# Patient Record
Sex: MALE | Race: BLACK OR AFRICAN AMERICAN | NOT HISPANIC OR LATINO
[De-identification: names, ages, dates, MRNs, and addresses within clinical notes are randomized per-mention and may not be internally consistent; named-entity substitution may affect disease eponyms.]

---

## 2024-10-02 ENCOUNTER — APPOINTMENT (OUTPATIENT)
Dept: GASTROENTEROLOGY | Facility: CLINIC | Age: 24
End: 2024-10-02
Payer: COMMERCIAL

## 2024-10-02 DIAGNOSIS — R01.1 CARDIAC MURMUR, UNSPECIFIED: ICD-10-CM

## 2024-10-02 DIAGNOSIS — G47.30 SLEEP APNEA, UNSPECIFIED: ICD-10-CM

## 2024-10-02 DIAGNOSIS — J45.909 UNSPECIFIED ASTHMA, UNCOMPLICATED: ICD-10-CM

## 2024-10-02 DIAGNOSIS — K22.89 OTHER SPECIFIED DISEASE OF ESOPHAGUS: ICD-10-CM

## 2024-10-02 PROBLEM — Z00.00 ENCOUNTER FOR PREVENTIVE HEALTH EXAMINATION: Status: ACTIVE | Noted: 2024-10-02

## 2024-10-02 PROCEDURE — G2211 COMPLEX E/M VISIT ADD ON: CPT | Mod: NC

## 2024-10-02 PROCEDURE — 99203 OFFICE O/P NEW LOW 30 MIN: CPT

## 2024-10-02 RX ORDER — BUDESONIDE AND FORMOTEROL FUMARATE DIHYDRATE 160; 4.5 UG/1; UG/1
160-4.5 AEROSOL RESPIRATORY (INHALATION)
Refills: 0 | Status: ACTIVE | COMMUNITY

## 2024-10-02 NOTE — HISTORY OF PRESENT ILLNESS
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  Patient reports doing well today.  He said having the procedure for intermittent epigastric pain (over 1 year) and rectal bleed x 3.   He denies any difficulty swallowing, Nausea, Vomiting, bloating, belching, epigastric pain or weight loss.   Denies any family history of GI cancers.      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  Biopsied. -Gastritis. -Normal ampulla, duodenal bulb, first portion of the duodenum and second portion of the duodenum.  Biopsied. -The examination was otherwise normal. -Several biopsies were obtained in the gastric body and in the gastric antrum.   Biopsy results A.  Duodenal mucosa with no significant histopathologic changes. B.  Gastric antrum - mild reactive gastropathy. C.  Gastric, body - gastric mucosa with no significant histopathologic changes.  Negative for H. pylori and IM B.  Esophagus, GE junction - acute erosive esophagitis with granulation tissue.  Negative for fungi.

## 2024-10-02 NOTE — ASSESSMENT
[FreeTextEntry1] : 24-year-old male status post upper endoscopy noting to have an esophageal tumor at the GEJ with biopsies reading acute erosive esophagitis with granulation tissue. Reviewed results with the patient today.  Discussed the recommendations for further evaluation with endoscopic ultrasound with biopsies.  He understood.   We discussed the proposed procedure, preparation and alternatives.  The risks (bleeding, perforation, infection) and benefits were discussed with the patient in details.  The patient understands the risks/benefits and agreed to proceed with procedure.   Plan 1.  EUS with Dr. Butts on 10/4/2024 2.  Instructions to be emailed  All questions answered.  Patient to call me with any questions.

## 2024-10-02 NOTE — REASON FOR VISIT
[Home] : at home, [unfilled] , at the time of the visit. [Medical Office: ([facility])___] : at the medical office located in  [Patient] : the patient [Self] : self [Consultation] : a consultation visit [FreeTextEntry1] : Referred by Dr Aram Azul (outside referring) for esophageal mass.

## 2024-10-04 ENCOUNTER — APPOINTMENT (OUTPATIENT)
Dept: GASTROENTEROLOGY | Facility: HOSPITAL | Age: 24
End: 2024-10-04

## 2024-10-04 ENCOUNTER — RESULT REVIEW (OUTPATIENT)
Age: 24
End: 2024-10-04

## 2024-10-04 ENCOUNTER — OUTPATIENT (OUTPATIENT)
Dept: OUTPATIENT SERVICES | Facility: HOSPITAL | Age: 24
LOS: 1 days | Discharge: HOME | End: 2024-10-04
Payer: COMMERCIAL

## 2024-10-04 ENCOUNTER — TRANSCRIPTION ENCOUNTER (OUTPATIENT)
Age: 24
End: 2024-10-04

## 2024-10-04 VITALS
HEART RATE: 66 BPM | DIASTOLIC BLOOD PRESSURE: 71 MMHG | OXYGEN SATURATION: 97 % | SYSTOLIC BLOOD PRESSURE: 117 MMHG | RESPIRATION RATE: 15 BRPM

## 2024-10-04 VITALS
TEMPERATURE: 97 F | RESPIRATION RATE: 16 BRPM | OXYGEN SATURATION: 100 % | HEART RATE: 52 BPM | SYSTOLIC BLOOD PRESSURE: 137 MMHG | HEIGHT: 72 IN | WEIGHT: 220.02 LBS | DIASTOLIC BLOOD PRESSURE: 75 MMHG

## 2024-10-04 DIAGNOSIS — K22.89 OTHER SPECIFIED DISEASE OF ESOPHAGUS: ICD-10-CM

## 2024-10-04 PROCEDURE — 43237 ENDOSCOPIC US EXAM ESOPH: CPT

## 2024-10-04 PROCEDURE — 88305 TISSUE EXAM BY PATHOLOGIST: CPT | Mod: 26

## 2024-10-04 PROCEDURE — 43254 EGD ENDO MUCOSAL RESECTION: CPT

## 2024-10-04 DEVICE — DUETTE MULTI-BAND: Type: IMPLANTABLE DEVICE | Status: FUNCTIONAL

## 2024-10-04 DEVICE — IMPLANTABLE DEVICE: Type: IMPLANTABLE DEVICE | Status: FUNCTIONAL

## 2024-10-04 DEVICE — CLIP HEMO INSTINCT PLUS ENDOSCOPIC: Type: IMPLANTABLE DEVICE | Status: FUNCTIONAL

## 2024-10-04 RX ORDER — OMEPRAZOLE 20 MG/1
20 CAPSULE, DELAYED RELEASE ORAL TWICE DAILY
Qty: 28 | Refills: 0 | Status: ACTIVE | COMMUNITY
Start: 2024-10-04 | End: 1900-01-01

## 2024-10-04 RX ORDER — SUCRALFATE 1 G/1
1 TABLET ORAL
Qty: 42 | Refills: 0 | Status: ACTIVE | COMMUNITY
Start: 2024-10-04 | End: 1900-01-01

## 2024-10-04 NOTE — ASU DISCHARGE PLAN (ADULT/PEDIATRIC) - NSDISCH_INCISION_ENDO_ALL_CORE_FT
If an incision was performed as part of your procedure, contact your doctor with any pain, swelling, redness, or other concerns you may have about that area. 64

## 2024-10-04 NOTE — PRE PROCEDURE NOTE - PRE PROCEDURE EVALUATION
HPI:    Here for endoscopic ultrasound.     PAST MEDICAL & SURGICAL HISTORY:    See chart.    MEDICATIONS:    See chart.     ALLERGIES:    See chart.     SOCIAL HISTORY:     Denies toxic habits.     FAMILY HISTORY:    Noncontributory.     REVIEW OF SYSTEMS:  CONSTITUTIONAL: No weakness, fevers or chills.  EYES/ENT: No visual changes;  No vertigo or throat pain.  NECK: No pain or stiffness.  RESPIRATORY: No cough, wheezing, hemoptysis; No shortness of breath.  CARDIOVASCULAR: No chest pain or palpitations.  GASTROINTESTINAL: As per HPI.   GENITOURINARY: No dysuria, frequency or hematuria.  NEUROLOGICAL: No numbness or weakness.  SKIN: No itching, rashes.      PHYSICAL EXAM:  VITAL SIGNS:  See chart.     GENERAL: YVAN, non toxic, comfortable in bed  HEENT: EOMI, no icterus, no tracheal deviation, moist mucus membranes   CARDIO: Regular rate and rhythm, no murmurs, rubs or gallops  LUNGS: No wheezing, rales or rhonchi  ABDOMEN: Soft, non tender, non distended, no rebound or guarding  VASCULAR: Warm and well perfused without peripheral edema and palpable pulses  PSYCH AAO x 3, normal mood and affect   SKIN: warm, dry, intact     LABS:                 RADIOLOGY & ADDITIONAL TESTS: Reviewed.       Risks, benefits, and alternatives of the procedure discussed at length including but not limited to bleeding, perforation, anesthesia related complication, infection, etc. Patient expressed understanding and agreeable for procedure. Patient stable for planned procedure.

## 2024-10-04 NOTE — ASU DISCHARGE PLAN (ADULT/PEDIATRIC) - NS MD DC FALL RISK RISK
For information on Fall & Injury Prevention, visit: [email or web address] OR  [email or web address] OR  https://www.cdc.gov/steadi/patient.html

## 2024-10-08 ENCOUNTER — NON-APPOINTMENT (OUTPATIENT)
Age: 24
End: 2024-10-08

## 2024-10-08 LAB — SURGICAL PATHOLOGY STUDY: SIGNIFICANT CHANGE UP

## 2025-03-19 ENCOUNTER — EMERGENCY (EMERGENCY)
Facility: HOSPITAL | Age: 25
LOS: 0 days | Discharge: HOME | End: 2025-03-19
Attending: STUDENT IN AN ORGANIZED HEALTH CARE EDUCATION/TRAINING PROGRAM
Payer: COMMERCIAL

## 2025-03-19 VITALS
WEIGHT: 214.07 LBS | OXYGEN SATURATION: 97 % | SYSTOLIC BLOOD PRESSURE: 138 MMHG | TEMPERATURE: 98 F | HEART RATE: 77 BPM | HEIGHT: 72 IN | RESPIRATION RATE: 18 BRPM | DIASTOLIC BLOOD PRESSURE: 93 MMHG

## 2025-03-19 DIAGNOSIS — G47.33 OBSTRUCTIVE SLEEP APNEA (ADULT) (PEDIATRIC): ICD-10-CM

## 2025-03-19 DIAGNOSIS — H16.133 PHOTOKERATITIS, BILATERAL: ICD-10-CM

## 2025-03-19 DIAGNOSIS — H57.13 OCULAR PAIN, BILATERAL: ICD-10-CM

## 2025-03-19 DIAGNOSIS — J45.909 UNSPECIFIED ASTHMA, UNCOMPLICATED: ICD-10-CM

## 2025-03-19 DIAGNOSIS — H53.143 VISUAL DISCOMFORT, BILATERAL: ICD-10-CM

## 2025-03-19 PROCEDURE — 99284 EMERGENCY DEPT VISIT MOD MDM: CPT

## 2025-03-19 RX ORDER — ERYTHROMYCIN 5 MG/G
1 OINTMENT OPHTHALMIC
Qty: 1 | Refills: 0
Start: 2025-03-19 | End: 2025-03-21

## 2025-03-19 NOTE — ED ADULT NURSE NOTE - OBJECTIVE STATEMENT
24 year old ellen came in Patient reports" I was working last night. and my eyes exposed to a third rail flash. " First vision was diminished in Right eye. Today both eyes red "It feels like sandpaper is in the eyes."  Patient was 3 feet away from rail. Denies vision loss. + photophobia. Rates 6/10  PMH: Asthma, Heat murmur, Sleep apnea, Esophagus mass excised.

## 2025-03-19 NOTE — ED PROVIDER NOTE - PHYSICAL EXAMINATION
GEN: Awake, alert, interactive, NAD.  HEAD AND NECK: NC/AT. Airway patent. Neck supple.   EYES:  Clear b/l. EOMI. PERRL. Visual acuity -  R 20/30, L 20/25 (@ baseline). No fluorescein uptake on exam, negative Giulia's sign.   ENT: Moist mucus membranes.   CARDIAC: Regular rate, regular rhythm. No evident pedal edema.    RESP/CHEST: Normal respiratory effort with no use of accessory muscles or retractions. Clear throughout on auscultation.  ABD: Soft, non-distended, non-tender. No rebound, no guarding.   BACK: No midline spinal TTP. No CVAT.   EXTREMITIES: Moving all extremities with no apparent deformities.   SKIN: Warm, dry, intact normal color. No rash.   NEURO: AOx3, CN II-XII grossly intact, no focal deficits.   PSYCH: Appropriate mood and affect.

## 2025-03-19 NOTE — ED PROVIDER NOTE - PATIENT PORTAL LINK FT
You can access the FollowMyHealth Patient Portal offered by [facility] by registering at the following website: [email or web address]. By joining [employer]’s FollowMyHealth portal, you will also be able to view your health information using other applications (apps) compatible with our system.

## 2025-03-19 NOTE — ED PROVIDER NOTE - NSFOLLOWUPCLINICS_GEN_ALL_ED_FT
[facility] - Ophthalmology  Ophthalmology  [address], [address]  [place] [address]  Phone: [phone]  Fax:     [facility] Ophthal[facility]  Ophthalmology  [address], [address]  [place] [address]  Phone: [phone]  Fax:

## 2025-03-19 NOTE — ED PROVIDER NOTE - CLINICAL SUMMARY MEDICAL DECISION MAKING FREE TEXT BOX
24M PMH Asthma, CESARIO who presents to ED w/ bilateral eye discomfort, photophobia s/p exposure to bright flash of light at work this AM. Pt states he works for the [employer], reports piece of metal came into contact w/ electrified 3rd rail, w/ resulting extremely bright flash of light. Pt states he was ~3ft from the rail. Pt himself did not physically come into contact w/ the 3rd rail. Pt states initially unable to see anything, w/ gradual return of vision. Pt endorses sandpaper sensation to BL eyes. Pt also reports w/ BL ear discomfort, ringing, which is resolving. ROS otherwise negative. Pt uses glasses for distance, does not use contact lenses. Pt was not wearing protective eyewear during exposure.     On PE - pt well-appearing, no acute distress, head atraumatic, normocephalic, eyes are clear bilaterally, PERRL, visual acuity RT 20/30, LT 20/25 (baseline discrepancy), No fluorescein uptake on exam.     Most likely photokeratitis given history. Will DC home w/ erythromycin ointment, OTC medications for pain relief, and outpatient Optho follow-up.

## 2025-03-19 NOTE — ED PROVIDER NOTE - ATTENDING APP SHARED VISIT CONTRIBUTION OF CARE
Eliana: Initial patient evaluation and treatment plan initiated by myself, pt care transitioned to ACP to follow results of ED w/u and implementation of agreed upon disposition.

## 2025-03-19 NOTE — ED PROVIDER NOTE - NSFOLLOWUPINSTRUCTIONS_ED_ALL_ED_FT
Follow-up with your Primary Care Physician within the next week.  Follow-up with Opthalmology (Eye Doctor) as discussed.    Medications  - Take Tylenol (Acetaminophen) 650 mg every 4-6 hours AND/OR Motrin (Ibuprofen) 600 mg every 6-8 hours as needed for pain/fever.  - Erythromycin ointment, 1 application, 4 times a day, for 3 days.   - Lubricating eye drops / gel as needed for eye irritation / discomfort.    Advance activity as tolerated.  Continue all previously prescribed medications as directed unless otherwise instructed.  Follow up with your primary care physician in 48-72 hours- bring copies of your results.  Return to the ER for worsening or persistent symptoms, and/or ANY NEW OR CONCERNING SYMPTOMS such as fever, chest pain, shortness of breath, abdominal pain, or headaches. If you have issues obtaining follow up, please call: [phone] to obtain a doctor or specialist who takes your insurance in your area.  You may call [phone] to make an appointment with the internal medicine clinic.    Follow these instructions at home:  Take or apply over-the-counter and prescription medicines only as told by your health care provider. This includes eye drops.  If directed, apply a warm compress to your eyes to help reduce inflammation. Place a towel over your eyes and gently press the warm compress over your eyes for about 5 minutes, or as long as told by your health care provider.  Drink plenty of fluids to stay well hydrated.  If possible, avoid dry, drafty environments.  Wear sunglasses when outdoors to protect your eyes from the sun and wind.  Use a humidifier at home to increase moisture in the air.  Remember to blink often when reading or using the computer for long periods.  If you wear contact lenses, remove them regularly to give your eyes a break. Always remove contacts before sleeping.  Have a yearly eye exam and vision test.  Keep all follow-up visits as told by your health care provider. This is important.    Contact a health care provider if:  You have eye pain.  You have pus-like fluid coming from your eye.  Your symptoms get worse or do not improve with treatment.    Get help right away if:  Your vision suddenly changes.

## 2025-03-19 NOTE — ED ADULT TRIAGE NOTE - CHIEF COMPLAINT QUOTE
Patient reports" I was working last night. and my eyes exposed to a third rail flash. " First vision was diminished in Right eye. Today both eyes red "It feels like sandpaper is in the eyes."  Patient was 3 feet away from rail. Denies vision loss. + photophobia. Rates 6/10  PMH: Asthma, Heat murmur, Sleep apnea, Esophagus mass excised.

## 2025-03-19 NOTE — ED PROVIDER NOTE - OBJECTIVE STATEMENT
24M PMH asthma, CESARIO pw BL eye discomfort, photophobia s/p exposure to bright flash of light at work this AM. Pt states he works for the [employer], reports piece of metal came into contact w/ electrified 3rd rail, w/ resulting extremely bright flash of light. Pt states he was ~3ft from the rail. Pt himself did not physically come into contact w/ the 3rd rail. Pt states initially unable to see anything, w/ gradual return of vision. Pt endorses sandpaper sensation to BL eyes. Pt also reports w/ BL ear discomfort, ringing, which is resolving. ROS otherwise negative. Pt uses glasses for distance, does not use contact lenses. Pt was not wearing protective eyewear during exposure.     PMH as above, heart murmur, PSH endoscopic esophageal mass excision, NKDA, Meds as listed.

## 2025-03-20 PROBLEM — Z87.19 PERSONAL HISTORY OF OTHER DISEASES OF THE DIGESTIVE SYSTEM: Chronic | Status: ACTIVE | Noted: 2024-10-04

## 2025-03-20 PROBLEM — G47.33 OBSTRUCTIVE SLEEP APNEA (ADULT) (PEDIATRIC): Chronic | Status: ACTIVE | Noted: 2024-10-04

## 2025-03-20 PROBLEM — J45.909 UNSPECIFIED ASTHMA, UNCOMPLICATED: Chronic | Status: ACTIVE | Noted: 2024-10-04

## (undated) DEVICE — CLAMP BX HOT RAD JAW 3

## (undated) DEVICE — POLY TRAP ETRAP

## (undated) DEVICE — BIOPSY FORCEP RADIAL JAW 4 STANDARD WITH NEEDLE

## (undated) DEVICE — DRSG 2X2

## (undated) DEVICE — DENTURE CUP PINK

## (undated) DEVICE — DRSG CURITY GAUZE SPONGE 4 X 4" 12-PLY NON-STERILE

## (undated) DEVICE — TUBING MEDI-VAC W MAXIGRIP CONNECTORS 1/4"X6'

## (undated) DEVICE — CONTAINER FORMALIN 80ML YELLOW

## (undated) DEVICE — CATH IV SAFE BC 22G X 1" (BLUE)

## (undated) DEVICE — PACK IV START WITH CHG

## (undated) DEVICE — BASIN EMESIS 10IN GRADUATED MAUVE

## (undated) DEVICE — TUBING IV SET GRAVITY 3Y 100" MACRO

## (undated) DEVICE — BITE BLOCK ADULT 20 X 27MM (GREEN)

## (undated) DEVICE — SALIVA EJECTOR (BLUE)

## (undated) DEVICE — ELCTR ECG CONDUCTIVE ADHESIVE

## (undated) DEVICE — BIOPSY FORCEP COLD DISP

## (undated) DEVICE — DRSG BANDAID 0.75X3"

## (undated) DEVICE — GOWN LG

## (undated) DEVICE — UNDERPAD LINEN SAVER 17 X 24"

## (undated) DEVICE — LUBRICATING JELLY HR ONE SHOT 3G